# Patient Record
Sex: FEMALE | Race: WHITE | NOT HISPANIC OR LATINO
[De-identification: names, ages, dates, MRNs, and addresses within clinical notes are randomized per-mention and may not be internally consistent; named-entity substitution may affect disease eponyms.]

---

## 2019-06-03 ENCOUNTER — APPOINTMENT (OUTPATIENT)
Dept: PHYSICAL MEDICINE AND REHAB | Facility: CLINIC | Age: 54
End: 2019-06-03
Payer: COMMERCIAL

## 2019-06-03 VITALS — BODY MASS INDEX: 19.02 KG/M2 | WEIGHT: 106 LBS | HEIGHT: 62.5 IN

## 2019-06-03 DIAGNOSIS — M96.842 POSTPROCEDURAL SEROMA OF A MUSCULOSKELETAL STRUCTURE FOLLOWING A MUSCULOSKELETAL SYSTEM PROCEDURE: ICD-10-CM

## 2019-06-03 DIAGNOSIS — M54.16 RADICULOPATHY, LUMBAR REGION: ICD-10-CM

## 2019-06-03 PROBLEM — Z00.00 ENCOUNTER FOR PREVENTIVE HEALTH EXAMINATION: Status: ACTIVE | Noted: 2019-06-03

## 2019-06-03 PROCEDURE — 99204 OFFICE O/P NEW MOD 45 MIN: CPT

## 2019-06-03 NOTE — HISTORY OF PRESENT ILLNESS
[FreeTextEntry1] : Patient complains of seroma that developed three weeks after minimally invasive back surgery on April 9, 2019 to correct left drop foot. She had it drained twice. She does not have pain unless she hyperextends her left leg and feels pain in the left buttock/low back.

## 2019-06-03 NOTE — ASSESSMENT
[FreeTextEntry1] : Advised to start PT for ankle dorsiflexion weakness, will see Dr. Blankenship for second opinion on management of seroma.

## 2019-06-03 NOTE — PHYSICAL EXAM
[Gait - Steppage Left] : a steppage gait was noted on the left [Normal Motor Tone] : the muscle tone was normal [Involuntary Movements] : no involuntary movements were seen [Normal] : Oriented to person, place, and time, insight and judgement were intact and the affect was normal [de-identified] : +Seroma, TTP pver the left paraspinal muscles, ROM limited with flexion due to pain